# Patient Record
Sex: MALE | Race: WHITE
[De-identification: names, ages, dates, MRNs, and addresses within clinical notes are randomized per-mention and may not be internally consistent; named-entity substitution may affect disease eponyms.]

---

## 2017-05-12 ENCOUNTER — HOSPITAL ENCOUNTER (OUTPATIENT)
Dept: HOSPITAL 45 - C.GI | Age: 82
Discharge: HOME | End: 2017-05-12
Attending: INTERNAL MEDICINE
Payer: COMMERCIAL

## 2017-05-12 VITALS — HEART RATE: 58 BPM | OXYGEN SATURATION: 96 % | SYSTOLIC BLOOD PRESSURE: 132 MMHG | DIASTOLIC BLOOD PRESSURE: 60 MMHG

## 2017-05-12 VITALS
HEIGHT: 70.98 IN | WEIGHT: 182.39 LBS | BODY MASS INDEX: 25.53 KG/M2 | WEIGHT: 182.39 LBS | BODY MASS INDEX: 25.53 KG/M2 | HEIGHT: 70.98 IN

## 2017-05-12 DIAGNOSIS — Z79.899: ICD-10-CM

## 2017-05-12 DIAGNOSIS — K64.8: ICD-10-CM

## 2017-05-12 DIAGNOSIS — D12.2: Primary | ICD-10-CM

## 2017-05-12 DIAGNOSIS — Z79.82: ICD-10-CM

## 2017-05-12 NOTE — GI REPORT
Procedure Date: 5/12/2017 11:17 AM

Procedure:            Colonoscopy

Indications:          Rectal pain

Medicines:            Monitored Anesthesia Care

Complications:        No immediate complications.

Estimated Blood Loss: Estimated blood loss: none.

Procedure:            Pre-Anesthesia Assessment:

                      - Prior to the procedure, a History and Physical was 

                      performed, and patient medications and allergies were 

                      reviewed. The patient's tolerance of previous 

                      anesthesia was also reviewed. The risks and benefits of 

                      the procedure and the sedation options and risks were 

                      discussed with the patient. All questions were 

                      answered, and informed consent was obtained. Prior 

                      Anticoagulants: The patient has taken aspirin, last 

                      dose was 1 day prior to procedure. ASA Grade 

                      Assessment: III - A patient with severe systemic 

                      disease. After reviewing the risks and benefits, the 

                      patient was deemed in satisfactory condition to undergo 

                      the procedure.

                      After I obtained informed consent, the scope was passed 

                      under direct vision. Throughout the procedure, the 

                      patient's blood pressure, pulse, and oxygen saturations 

                      were monitored continuously. The scope was introduced 

                      through the anus and advanced to the terminal ileum. 

                      The colonoscopy was performed without difficulty. The 

                      patient tolerated the procedure well. The quality of 

                      the bowel preparation was good. The terminal ileum, 

                      ileocecal valve, appendiceal orifice, and rectum were 

                      photographed.

Findings:

     A 5 mm polyp was found in the ascending colon. The polyp was sessile. 

     The polyp was removed with a hot snare. Resection and retrieval were 

     complete.

     Non-bleeding internal hemorrhoids were found during retroflexion. The 

     hemorrhoids were small.

Impression:           - One 5 mm polyp in the ascending colon, removed with a 

                      hot snare. Resected and retrieved.

                      - Non-bleeding internal hemorrhoids.

Recommendation:       - Resume previous diet.

                      - Continue present medications.

                      - Repeat colonoscopy for surveillance based on 

                      pathology results.

                      - Return to primary care physician as previously 

                      scheduled.

Davi Arellano DO

5/12/2017 11:56:51 AM

This report has been signed electronically.

Note Initiated On: 5/12/2017 11:17 AM

     I attest to the content of the Intraoperative Record and orders 

     documented therein, exceptions below

## 2017-05-12 NOTE — ENDO HISTORY AND PHYSICAL
History & Physical


Date of Service:


May 12, 2017.


Chief Complaint:


Epgastric pain, Abd pain, Rectal burning


Referring Physician:


Luis Alfredo Rodriguez


History of Present Illness


81 yo CM who presents for Colonoscopy secondary to rectal burning.





Past Surgical History


Hx Cardiac Surgery:  Yes (HEART CATH-NO STENTS)


Hx Internal Defibrillator:  No


Hx Pacemaker:  No


Hx Abdominal Surgery:  Yes (CÉSAR)


Hx of Implantable Prosthesis:  No


Hx Post-Op Nausea and Vomiting:  No


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


Hx Thoracic Surgery:  No


Hx Orthopedic:  No


Hx Urinary Tract Surgery:  No





Family History


None





Social History


Smoking Status:  Never Smoker


Hx Substance Use:  No


Hx Alcohol Use:  No





Allergies


Coded Allergies:  


     Glipizide (Verified  Allergy, Mild, UNKNOWN, 5/3/17)


     Metformin (Verified  Allergy, Mild, UNKNOWN, 5/3/17)


     POLLEN (Verified  Allergy, Unknown, POLLEN GRASS-OCC ASTHMA SYMTOMS, 5/3/17

)


     Penicillins (Verified  Allergy, Unknown, RASH, 5/3/17)





Current Medications





 Reported Home Medications








 Medications  Dose


 Route/Sig


 Max Daily Dose Days Date Category Dose


Instructions


 


 Zofran


  (Ondansetron HCl)


 4 Mg Tab  4 Mg


 PO AS DIRECTED PRN


    5/3/17 Reported 


 


 Nitrostat


  (Nitroglycerin)


 0.4 Mg Tab  0.4 Mg


 UT PRN


    5/3/17 Reported 


 


 Jardiance


  (Empagliflozin)


 10 Mg Tab  1 Tab


 PO QAM


    5/3/17 Reported 


 


 Dicyclomine Hcl


 20 Mg Tab  1 Tab


 PO BID PRN


   30 5/3/17 Reported 


 


 Colchicine 0.6 Mg


 Tab  0.6 Mg


 PO DAILY PRN


    5/3/17 Reported 


 


 Zyloprim


  (Allopurinol) 100


 Mg Tab  100 Mg


 PO HS


    5/3/17 Reported 


 


 Prilosec


  (Omeprazole) 20


 Mg Capcr  20 Mg


 PO QAM


    5/3/17 Reported 


 


 Percocet


 5MG/325MG


  (Oxycodone/Acetaminophen)


 Tab  1-2 Tablets


 PO Q4-6H PRN


    6/16/16 Reported  PAIN


 


 Multivitamin


  (Multivitamins)


 Tab  1 Tab


 PO QAM


    6/16/16 Reported 


 


 Mevacor


  (Lovastatin) 40


 Mg Tab  40 Mg


 PO HS


    6/16/16 Reported 


 


 Levothyroxine


 Sodium 75 Mcg Tab  1 Tab


 PO QAM


   90 6/16/16 Reported 


 


 Tradjenta


  (Linagliptin) 5


 Mg Tab  1 Tab


 PO QAM


   90 6/16/16 Reported 


 


 Coreg


  (Carvedilol) 6.25


 Mg Tab  6.25 Mg


 PO BID


    6/16/16 Reported 


 


 Zestril


  (Lisinopril) 5 Mg


 Tab  5 Mg


 PO QAM


    6/16/16 Reported 


 


 Proscar


  (Finasteride) 5


 Mg Tab  5 Mg


 PO QAM


    6/16/16 Reported 


 


 Aspirin Ec


  (Aspirin) 81 Mg


 Tab  81 Mg


 PO QAM


    6/16/16 Reported 


 


 Lopid


  (Gemfibrozil) 600


 Mg Tab  600 Mg


 PO BID


    6/16/16 Reported 


 


 Flaxseed Oil


  (Flaxseed


  (Linseed)) 1,000


 Mg Cap  1,000 Mg


 PO BID


    6/16/16 Reported 











Vital Signs


Weight (Kilograms):  82.73


Height (Feet):  5


Height (Inches):  11











  Date Time  Temp Pulse Resp B/P Pulse Ox O2 Delivery O2 Flow Rate FiO2


 


5/12/17 10:27 36.6 58 18 138/65 96 Room Air  











Physical Exam


General Appearance:  WD/WN, no apparent distress


Respiratory/Chest:  


   Auscultation:  breath sounds normal


Cardiovascular:  


   Heart Auscultation:  RRR


Abdomen:  


   Bowel Sounds:  normal


   Inspection & Palpation:  soft, non-distended, no tenderness, guarding & 

rebound





Assessment and Plan


Assessment:


81 yo CM who presents for Colonoscopy secondary to rectal burning.








Plan:


Proceed with Colonoscopy.

## 2017-05-12 NOTE — ANESTHESIOLOGY PROGRESS NOTE
Anesthesia Post Op Note


Date & Time


May 12, 2017 at 12:31





Vital Signs


Pain Intensity:  0





 Vital Signs Past 12 Hours








  Date Time  Temp Pulse Resp B/P Pulse Ox O2 Delivery O2 Flow Rate FiO2


 


5/12/17 12:20  58 18 121/52 96 Room Air  


 


5/12/17 12:05 36.9 54 18 122/51 100 Room Air  


 


5/12/17 10:27 36.6 58 18 138/65 96 Room Air  











Notes


Mental Status:  alert / awake / arousable, participated in evaluation


Pt Amnestic to Procedure:  Yes


Nausea / Vomiting:  adequately controlled


Pain:  adequately controlled


Airway Patency, RR, SpO2:  stable & adequate


BP & HR:  stable & adequate


Hydration State:  stable & adequate


Anesthetic Complications:  no major complications apparent

## 2017-05-12 NOTE — DISCHARGE INSTRUCTIONS
Endoscopy Patient Instructions


Date / Procedure(s) Performed


May 12, 2017.


Colonoscopy





Allergy Information


Coded Allergies:  


     Glipizide (Verified  Allergy, Mild, UNKNOWN, 5/3/17)


     Metformin (Verified  Allergy, Mild, UNKNOWN, 5/3/17)


     POLLEN (Verified  Allergy, Unknown, POLLEN GRASS-OCC ASTHMA SYMTOMS, 5/3/17

)


     Penicillins (Verified  Allergy, Unknown, RASH, 5/3/17)





Discharge Date / Findings


May 12, 2017.


Colon polyp


Internal hemorrhoids





Medication Instructions


Stopped Medication(s):  


Held everything except BP meds and pantoprazole


OK to resume all medications today as prescribed





 Reported Home Medications








 Medications  Dose


 Route/Sig


 Max Daily Dose Days Date Category Dose


Instructions


 


 Zofran


  (Ondansetron HCl)


 4 Mg Tab  4 Mg


 PO AS DIRECTED PRN


    5/3/17 Reported 


 


 Nitrostat


  (Nitroglycerin)


 0.4 Mg Tab  0.4 Mg


 UT PRN


    5/3/17 Reported 


 


 Jardiance


  (Empagliflozin)


 10 Mg Tab  1 Tab


 PO QAM


    5/3/17 Reported 


 


 Dicyclomine Hcl


 20 Mg Tab  1 Tab


 PO BID PRN


   30 5/3/17 Reported 


 


 Colchicine 0.6 Mg


 Tab  0.6 Mg


 PO DAILY PRN


    5/3/17 Reported 


 


 Zyloprim


  (Allopurinol) 100


 Mg Tab  100 Mg


 PO HS


    5/3/17 Reported 


 


 Prilosec


  (Omeprazole) 20


 Mg Capcr  20 Mg


 PO QAM


    5/3/17 Reported 


 


 Percocet


 5MG/325MG


  (Oxycodone/Acetaminophen)


 Tab  1-2 Tablets


 PO Q4-6H PRN


    6/16/16 Reported  PAIN


 


 Multivitamin


  (Multivitamins)


 Tab  1 Tab


 PO QAM


    6/16/16 Reported 


 


 Mevacor


  (Lovastatin) 40


 Mg Tab  40 Mg


 PO HS


    6/16/16 Reported 


 


 Levothyroxine


 Sodium 75 Mcg Tab  1 Tab


 PO QAM


   90 6/16/16 Reported 


 


 Tradjenta


  (Linagliptin) 5


 Mg Tab  1 Tab


 PO QAM


   90 6/16/16 Reported 


 


 Coreg


  (Carvedilol) 6.25


 Mg Tab  6.25 Mg


 PO BID


    6/16/16 Reported 


 


 Zestril


  (Lisinopril) 5 Mg


 Tab  5 Mg


 PO QAM


    6/16/16 Reported 


 


 Proscar


  (Finasteride) 5


 Mg Tab  5 Mg


 PO QAM


    6/16/16 Reported 


 


 Aspirin Ec


  (Aspirin) 81 Mg


 Tab  81 Mg


 PO QAM


    6/16/16 Reported 


 


 Lopid


  (Gemfibrozil) 600


 Mg Tab  600 Mg


 PO BID


    6/16/16 Reported 


 


 Flaxseed Oil


  (Flaxseed


  (Linseed)) 1,000


 Mg Cap  1,000 Mg


 PO BID


    6/16/16 Reported 











Provider Instructions





Activity Restrictions





-  No exercising or heavy lifting for 24 hours. 


-  Do not drink alcohol the day of the procedure.


-  Do not drive a car or operate machinery until the day after the procedure.


-  Do not make any important decisions or sign important papers in 24 hours 

after the procedure.





Following Day:





-  Return to full activity which may include returning to work/school.





Diet





Start your diet with liquids and light foods (jello, soup, juice, toast).  Then 

eat your usual diet if not nauseated.





Treatment For Common After Affects





For mild abdominal pain, bloating, or excessive gas:





-  Rest


-  Eat lightly


-  Lie on right side





Follow-Up Information


Follow-up with Luis Alfredo Rodriguez as scheduled





Anesthesia Information





What You Should Know





You have had a procedure that required some medicine to reduce anxiety and 

discomfort. This treatment is called moderate sedation.  


After receiving the treatment, you may be sleepy, but you will be able to 

breathe on your own.  The effects of the treatment may last for several hours.








Follow these instructions along with Activity/Diet recommendations noted above:





*  Do NOT do anything where dizziness or clumsiness would be dangerous.





*  Rest quietly at home today, then you can be up and about tomorrow.





*  Have a responsible person stay with you the rest of today.





*  You may have had an I.V. today.  If so, you may take the dressing off later 

today.





Recommendations


 


Call your doctor if:





*  Trouble breathing 





*  Continuous vomiting for more than 24 hours





*  Temperature above 101 degrees





*  Severe abdominal pain or bloating





*  Pain not relieved by pain medicine ordered





*  There is increased drainage or redness from any incision





*  A large amount of rectal bleeding greater than 2-3 tablespoons. 


   (If you had a polyp/s removed or have hemorrhoids, a small amount of blood -


    from the rectum is to be expected.)





*  You have any unanswered questions or concerns.





IN THE EVENT OF A SERIOUS EMERGENCY, GO TO THE NEAREST EMERGENCY ROOM





       Your discharge instructions were prepared by provider Davi Arellano.


 Patient Instructions Signature Page








Ronen Peterson 











Patient (or Guardian) Signature/Date:____________________________________ I 

have read and understand the instructions given to me by my caregivers.








Caregiver/RN/Doctor Signature/Date:____________________________________








The above-named patient and/or guardian has received patient instructions on 

this date.


























+  Original Patient Signature Page (only) stays with chart.  Please make copy 

for patient.

## 2017-08-10 ENCOUNTER — HOSPITAL ENCOUNTER (OUTPATIENT)
Dept: HOSPITAL 45 - C.PATHSPEC | Age: 82
Discharge: HOME | End: 2017-08-10
Attending: UROLOGY
Payer: COMMERCIAL

## 2017-08-10 DIAGNOSIS — C67.9: Primary | ICD-10-CM

## 2017-12-21 ENCOUNTER — HOSPITAL ENCOUNTER (OUTPATIENT)
Dept: HOSPITAL 45 - C.PATHSPEC | Age: 82
Discharge: HOME | End: 2017-12-21
Attending: UROLOGY
Payer: COMMERCIAL

## 2017-12-21 DIAGNOSIS — C67.9: Primary | ICD-10-CM

## 2018-02-26 ENCOUNTER — HOSPITAL ENCOUNTER (OUTPATIENT)
Dept: HOSPITAL 45 - C.LABMFLN | Age: 83
Discharge: HOME | End: 2018-02-26
Attending: INTERNAL MEDICINE
Payer: COMMERCIAL

## 2018-02-26 DIAGNOSIS — R07.89: ICD-10-CM

## 2018-02-26 DIAGNOSIS — I10: Primary | ICD-10-CM

## 2018-02-26 DIAGNOSIS — I25.10: ICD-10-CM

## 2018-02-26 DIAGNOSIS — I35.1: ICD-10-CM

## 2018-02-26 DIAGNOSIS — I42.9: ICD-10-CM

## 2018-02-26 LAB
BUN SERPL-MCNC: 30 MG/DL (ref 7–18)
CALCIUM SERPL-MCNC: 10.4 MG/DL (ref 8.5–10.1)
CO2 SERPL-SCNC: 27 MMOL/L (ref 21–32)
CREAT SERPL-MCNC: 1.13 MG/DL (ref 0.6–1.4)
GLUCOSE SERPL-MCNC: 149 MG/DL (ref 70–99)
POTASSIUM SERPL-SCNC: 4.4 MMOL/L (ref 3.5–5.1)
SODIUM SERPL-SCNC: 136 MMOL/L (ref 136–145)